# Patient Record
Sex: MALE | Race: WHITE | NOT HISPANIC OR LATINO | Employment: STUDENT | URBAN - METROPOLITAN AREA
[De-identification: names, ages, dates, MRNs, and addresses within clinical notes are randomized per-mention and may not be internally consistent; named-entity substitution may affect disease eponyms.]

---
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
## 2025-01-15 ENCOUNTER — APPOINTMENT (OUTPATIENT)
Dept: DERMATOLOGY | Facility: CLINIC | Age: 20
End: 2025-01-15
Payer: COMMERCIAL

## 2025-01-30 ENCOUNTER — APPOINTMENT (OUTPATIENT)
Dept: PRIMARY CARE | Facility: CLINIC | Age: 20
End: 2025-01-30
Payer: COMMERCIAL

## 2025-02-02 NOTE — PROGRESS NOTES
"[facility]: [facility]  PROGRESS NOTE  elida Faustin is a 19 y.o. male that is presenting today to establish care with new provider.  His last provider was his pediatrician Dr. Hill - last seen 10/25/2023    Assessment/Plan   {Assess/Plan SmartLinks (Optional):17509}  Subjective   HPI  Review of Systems   Objective   There were no vitals filed for this visit.   There is no height or weight on file to calculate BMI.  Physical Exam  Diagnostic Results   Lab Results   Component Value Date    GLUCOSE 92 03/21/2018    CALCIUM 9.5 03/21/2018     03/21/2018    K 4.3 03/21/2018    CO2 29 (H) 03/21/2018     03/21/2018    BUN 9 03/21/2018    CREATININE 0.63 03/21/2018     Lab Results   Component Value Date    ALT 19 03/21/2018    AST 18 03/21/2018    ALKPHOS 293 03/21/2018    BILITOT 0.6 03/21/2018     Lab Results   Component Value Date    WBC 6.9 03/21/2018    HGB 13.8 03/21/2018    HCT 40.3 03/21/2018    MCV 86 03/21/2018     03/21/2018     No results found for: \"CHOL\"  No results found for: \"HDL\"  No results found for: \"LDLCALC\"  No results found for: \"TRIG\"  No components found for: \"CHOLHDL\"  No results found for: \"HGBA1C\"  Other labs not included in the list above were reviewed either before or during this encounter.    History    Past Medical History:   Diagnosis Date    Encounter for screening for disorder due to exposure to contaminants     Screening for lead exposure    Other conditions influencing health status 09/09/2015    No significant past medical history    Other conditions influencing health status 09/09/2015    No significant past surgical history    Other injury of other muscle(s) and tendon(s) at lower leg level, unspecified leg, initial encounter 04/08/2022    Anterior shin splints    Post-traumatic stress disorder, unspecified     Post-traumatic stress     No past surgical history on file.  Family History   Problem Relation Name Age of Onset    " Asthma Father      Seizures Maternal Grandmother      Other (malignant neoplasm) Other          maternal relatives    Other (mental disorders) Other          paternal disorders     Social History     Socioeconomic History    Marital status: Single     Spouse name: Not on file    Number of children: Not on file    Years of education: Not on file    Highest education level: Not on file   Occupational History    Not on file   Tobacco Use    Smoking status: Not on file    Smokeless tobacco: Not on file   Substance and Sexual Activity    Alcohol use: Not on file    Drug use: Not on file    Sexual activity: Not on file   Other Topics Concern    Not on file   Social History Narrative    Not on file     Social Drivers of Health     Financial Resource Strain: Not on file   Food Insecurity: Not on file   Transportation Needs: Not on file   Physical Activity: Not on file   Stress: Not on file   Social Connections: Not on file   Intimate Partner Violence: Not on file   Housing Stability: Not on file     Allergies   Allergen Reactions    Nael Unknown     Current Outpatient Medications on File Prior to Visit   Medication Sig Dispense Refill    amphetamine-dextroamphetamine XR (Adderall XR) 15 mg 24 hr capsule       cloNIDine (Catapres) 0.1 mg tablet Take 1 tablet every day by oral route at bedtime for 30 days.      dexmethylphenidate XR (Focalin XR) 10 mg 24 hr capsule       EPINEPHrine 0.3 mg/0.3 mL injection syringe as directed for severe allergic reaction      FLUoxetine (PROzac) 20 mg tablet       triamcinolone (Kenalog) 0.1 % ointment        No current facility-administered medications on file prior to visit.     Immunization History   Administered Date(s) Administered    DTaP IPV combined vaccine (KINRIX, QUADRACEL) 05/22/2009    DTaP vaccine, pediatric  (INFANRIX) 2005, 07/19/2006    DTaP, Unspecified 2005, 2005    Flu vaccine (IIV4), preservative free *Check age/dose* 10/25/2023    HPV 9-valent vaccine  (GARDASIL 9) 09/14/2016, 08/25/2017    Hepatitis A vaccine, pediatric/adolescent (HAVRIX, VAQTA) 10/02/2013, 07/16/2014    Hepatitis B vaccine, 19 yrs and under (RECOMBIVAX, ENGERIX) 2005, 2005, 2005    HiB PRP-T conjugate vaccine (HIBERIX, ACTHIB) 07/19/2006    HiB, unspecified 2005, 2005, 2005    Influenza, injectable, quadrivalent 11/03/2020    MMR vaccine, subcutaneous (MMR II) 03/24/2006, 05/22/2009    Meningococcal ACWY vaccine (MENVEO) 09/14/2016, 08/17/2022    Pfizer Purple Cap SARS-CoV-2 03/28/2021, 04/18/2021, 07/20/2022    Pneumococcal Conjugate PCV 7 2005, 2005, 2005, 07/19/2006    Poliovirus vaccine, subcutaneous (IPOL) 2005, 2005, 04/18/2007, 09/18/2007    Tdap vaccine, age 7 year and older (BOOSTRIX, ADACEL) 09/14/2016    Varicella vaccine, subcutaneous (VARIVAX) 03/24/2006, 05/22/2009     Patient's medical history was reviewed and updated either before or during this encounter.       Lien Horn, APRN-CNP

## 2025-02-03 ENCOUNTER — APPOINTMENT (OUTPATIENT)
Dept: PRIMARY CARE | Facility: CLINIC | Age: 20
End: 2025-02-03
Payer: COMMERCIAL

## 2025-02-03 DIAGNOSIS — Z76.89 ENCOUNTER TO ESTABLISH CARE: Primary | ICD-10-CM

## 2025-02-04 PROBLEM — M21.40 ACQUIRED PES PLANUS: Status: ACTIVE | Noted: 2025-02-04

## 2025-02-04 PROBLEM — J10.1 INFLUENZA DUE TO INFLUENZA A VIRUS: Status: ACTIVE | Noted: 2025-02-04

## 2025-02-10 RX ORDER — SERTRALINE HYDROCHLORIDE 100 MG/1
TABLET, FILM COATED ORAL
COMMUNITY
End: 2025-02-11 | Stop reason: ALTCHOICE

## 2025-02-10 NOTE — PROGRESS NOTES
[facility]: [facility]  PROGRESS NOTE      Martinez Faustin is a 19 y.o. male that is presenting today to establish care with new provider.  His last provider was his pediatrician Dr. Hill - last seen 10/25/2023.  Has not seen a provider since 2023.  He has several concerns today.  He reports when he works out he gets hives and a sensation of pins and needles in his hands.  No chest pain, pressure or sob.  No dizziness or lightheadedness.  States he only eats once a day.  Has a history of left patellar tendonitis and when he does leg day at the gym he has left knee pain.  Denies an recent injury and no swelling.  Pain is achy and intermittent.  He also c/o of left muscular pain under his scapula when he does a lot of heavy lifting at the gym.      Assessment/Plan   Assessment & Plan  Encounter to establish care  Medications and problem list reviewed    Orders:    Comprehensive Metabolic Panel; Future    CBC; Future    Excessive sweating    Orders:    Thyroid Stimulating Hormone; Future    History of hepatomegaly  If abnormal will get us of liver  Orders:    Lipid Panel; Future    Muscle spasm  Massage area  Warm compresses or biofreeze, voltaren gel  Take first dose of flexeril at home to see how you feel can make you sleepy  Orders:    cyclobenzaprine (Flexeril) 10 mg tablet; Take 1 tablet (10 mg) by mouth 3 times a day as needed for muscle spasms.    Left knee pain, unspecified chronicity  Hx: patellar tendonitis  Will get x ray and will notify patient of results  Orders:    XR knee left 1-2 views; Future    Cholinergic urticaria  Stay hydrated prior to work outs  Shower after work outs  Eat at least 3 times a day tala protein         Subjective   HPI  Review of Systems   Constitutional: Negative.    Respiratory: Negative.     Cardiovascular: Negative.    Musculoskeletal:         See hpi for details   Skin:         See hpi for details   Neurological:         See hpi for details  "  Psychiatric/Behavioral: Negative.        Objective   Vitals:    02/11/25 1505   BP: 122/69   Pulse: 66   Temp: 36.6 °C (97.8 °F)   SpO2: 98%      Body mass index is 31.04 kg/m².  Physical Exam  Vitals reviewed.   Constitutional:       Appearance: Normal appearance.   Cardiovascular:      Rate and Rhythm: Normal rate and regular rhythm.      Heart sounds: Normal heart sounds.   Pulmonary:      Effort: Pulmonary effort is normal.      Breath sounds: Normal breath sounds.   Abdominal:      General: Bowel sounds are normal.      Palpations: Abdomen is soft.   Musculoskeletal:      Comments: Tight right scapular muscle - spasm  Normal left knee exam - no swelling, creptitus or pain with palpation  No tenderness with palpation.     Skin:     General: Skin is warm and dry.   Neurological:      General: No focal deficit present.      Mental Status: He is alert and oriented to person, place, and time.   Psychiatric:         Mood and Affect: Mood normal.         Behavior: Behavior normal.         Thought Content: Thought content normal.         Judgment: Judgment normal.       Diagnostic Results   Lab Results   Component Value Date    GLUCOSE 92 03/21/2018    CALCIUM 9.5 03/21/2018     03/21/2018    K 4.3 03/21/2018    CO2 29 (H) 03/21/2018     03/21/2018    BUN 9 03/21/2018    CREATININE 0.63 03/21/2018     Lab Results   Component Value Date    ALT 19 03/21/2018    AST 18 03/21/2018    ALKPHOS 293 03/21/2018    BILITOT 0.6 03/21/2018     Lab Results   Component Value Date    WBC 6.9 03/21/2018    HGB 13.8 03/21/2018    HCT 40.3 03/21/2018    MCV 86 03/21/2018     03/21/2018     No results found for: \"CHOL\"  No results found for: \"HDL\"  No results found for: \"LDLCALC\"  No results found for: \"TRIG\"  No components found for: \"CHOLHDL\"  No results found for: \"HGBA1C\"  Other labs not included in the list above were reviewed either before or during this encounter.    History    Past Medical History: "   Diagnosis Date    Anxiety     Depression     Encounter for screening for disorder due to exposure to contaminants     Screening for lead exposure    Other conditions influencing health status 09/09/2015    No significant past medical history    Other conditions influencing health status 09/09/2015    No significant past surgical history    Other injury of other muscle(s) and tendon(s) at lower leg level, unspecified leg, initial encounter 04/08/2022    Anterior shin splints    Post-traumatic stress disorder, unspecified     Post-traumatic stress     History reviewed. No pertinent surgical history.  Family History   Problem Relation Name Age of Onset    No Known Problems Mother      Emphysema Father      Seizures Maternal Grandmother      Other (malignant neoplasm) Other          maternal relatives    Other (mental disorders) Other          paternal disorders     Social History     Socioeconomic History    Marital status: Single     Spouse name: Not on file    Number of children: Not on file    Years of education: Not on file    Highest education level: Not on file   Occupational History    Not on file   Tobacco Use    Smoking status: Never     Passive exposure: Past    Smokeless tobacco: Never   Vaping Use    Vaping status: Some Days    Substances: THC    Devices: Disposable    Passive vaping exposure: Yes   Substance and Sexual Activity    Alcohol use: Never    Drug use: Yes     Types: Marijuana    Sexual activity: Not on file   Other Topics Concern    Not on file   Social History Narrative    Not on file     Social Drivers of Health     Financial Resource Strain: Not on file   Food Insecurity: Not on file   Transportation Needs: Not on file   Physical Activity: Not on file   Stress: Not on file   Social Connections: Not on file   Intimate Partner Violence: Not on file   Housing Stability: Not on file     Allergies   Allergen Reactions    Nael Unknown     Current Outpatient Medications on File Prior to Visit    Medication Sig Dispense Refill    acetaminophen (Tylenol) 325 mg tablet Take by mouth every 6 hours if needed for mild pain (1 - 3).      CREATINE, BULK, MISC       EPINEPHrine 0.3 mg/0.3 mL injection syringe as directed for severe allergic reaction      methyl salicylate-menthol (Icy Hot) 29-7.6 % ointment ointment Apply 1 Application topically if needed.      [DISCONTINUED] amphetamine-dextroamphetamine XR (Adderall XR) 10 mg 24 hr capsule       [DISCONTINUED] cloNIDine (Catapres) 0.1 mg tablet Take 1 tablet every day by oral route at bedtime for 30 days.      [DISCONTINUED] dexmethylphenidate XR (Focalin XR) 10 mg 24 hr capsule       [DISCONTINUED] FLUoxetine (PROzac) 20 mg tablet       [DISCONTINUED] ibuprofen 200 mg tablet Take by mouth every 6 hours.      [DISCONTINUED] sertraline (Zoloft) 100 mg tablet       [DISCONTINUED] triamcinolone (Kenalog) 0.1 % ointment        No current facility-administered medications on file prior to visit.     Immunization History   Administered Date(s) Administered    DTaP IPV combined vaccine (KINRIX, QUADRACEL) 05/22/2009    DTaP vaccine, pediatric  (INFANRIX) 2005, 07/19/2006    DTaP, Unspecified 2005, 2005    Flu vaccine (IIV4), preservative free *Check age/dose* 10/25/2023    HPV 9-valent vaccine (GARDASIL 9) 09/14/2016, 08/25/2017    Hepatitis A vaccine, pediatric/adolescent (HAVRIX, VAQTA) 10/02/2013, 07/16/2014    Hepatitis B vaccine, 19 yrs and under (RECOMBIVAX, ENGERIX) 2005, 2005, 2005    HiB PRP-T conjugate vaccine (HIBERIX, ACTHIB) 07/19/2006    HiB, unspecified 2005, 2005, 2005    Influenza, injectable, quadrivalent 11/03/2020, 11/30/2022    Influenza, seasonal, injectable 2005    MMR vaccine, subcutaneous (MMR II) 03/24/2006, 05/22/2009    Meningococcal ACWY vaccine (MENVEO) 09/14/2016, 08/17/2022    Meningococcal ACWY-D (Menactra) 4-valent conjugate vaccine 2005    Pfizer Purple Cap SARS-CoV-2  03/28/2021, 04/18/2021, 07/20/2022    Pneumococcal Conjugate PCV 7 2005, 2005, 2005, 07/19/2006    Poliovirus vaccine, subcutaneous (IPOL) 2005, 2005, 04/18/2007, 09/18/2007    Tdap vaccine, age 7 year and older (BOOSTRIX, ADACEL) 09/14/2016    Varicella vaccine, subcutaneous (VARIVAX) 03/24/2006, 05/22/2009     Patient's medical history was reviewed and updated either before or during this encounter.       Lien Horn, APRN-CNP

## 2025-02-11 ENCOUNTER — HOSPITAL ENCOUNTER (OUTPATIENT)
Dept: RADIOLOGY | Facility: CLINIC | Age: 20
Discharge: HOME | End: 2025-02-11
Payer: COMMERCIAL

## 2025-02-11 ENCOUNTER — APPOINTMENT (OUTPATIENT)
Dept: PRIMARY CARE | Facility: CLINIC | Age: 20
End: 2025-02-11
Payer: COMMERCIAL

## 2025-02-11 VITALS
SYSTOLIC BLOOD PRESSURE: 122 MMHG | DIASTOLIC BLOOD PRESSURE: 69 MMHG | TEMPERATURE: 97.8 F | WEIGHT: 245 LBS | BODY MASS INDEX: 30.46 KG/M2 | OXYGEN SATURATION: 98 % | HEART RATE: 66 BPM | HEIGHT: 75 IN

## 2025-02-11 DIAGNOSIS — Z76.89 ENCOUNTER TO ESTABLISH CARE: Primary | ICD-10-CM

## 2025-02-11 DIAGNOSIS — R61 EXCESSIVE SWEATING: ICD-10-CM

## 2025-02-11 DIAGNOSIS — Z87.898 HISTORY OF HEPATOMEGALY: ICD-10-CM

## 2025-02-11 DIAGNOSIS — L50.5 CHOLINERGIC URTICARIA: ICD-10-CM

## 2025-02-11 DIAGNOSIS — M25.562 LEFT KNEE PAIN, UNSPECIFIED CHRONICITY: ICD-10-CM

## 2025-02-11 DIAGNOSIS — M62.838 MUSCLE SPASM: ICD-10-CM

## 2025-02-11 PROBLEM — F32.A DEPRESSIVE DISORDER: Status: RESOLVED | Noted: 2023-04-12 | Resolved: 2025-02-11

## 2025-02-11 PROBLEM — F43.22 ADJUSTMENT DISORDER WITH ANXIOUS MOOD: Status: RESOLVED | Noted: 2023-04-12 | Resolved: 2025-02-11

## 2025-02-11 PROBLEM — R05.9 COUGH: Status: RESOLVED | Noted: 2023-04-12 | Resolved: 2025-02-11

## 2025-02-11 PROBLEM — F41.9 ANXIETY: Status: RESOLVED | Noted: 2023-04-12 | Resolved: 2025-02-11

## 2025-02-11 PROBLEM — J10.1 INFLUENZA DUE TO INFLUENZA A VIRUS: Status: RESOLVED | Noted: 2025-02-04 | Resolved: 2025-02-11

## 2025-02-11 PROCEDURE — 3008F BODY MASS INDEX DOCD: CPT | Performed by: NURSE PRACTITIONER

## 2025-02-11 PROCEDURE — 73560 X-RAY EXAM OF KNEE 1 OR 2: CPT | Mod: LT

## 2025-02-11 PROCEDURE — 99204 OFFICE O/P NEW MOD 45 MIN: CPT | Performed by: NURSE PRACTITIONER

## 2025-02-11 PROCEDURE — 99214 OFFICE O/P EST MOD 30 MIN: CPT | Performed by: NURSE PRACTITIONER

## 2025-02-11 PROCEDURE — 1036F TOBACCO NON-USER: CPT | Performed by: NURSE PRACTITIONER

## 2025-02-11 RX ORDER — ACETAMINOPHEN 325 MG/1
TABLET ORAL EVERY 6 HOURS PRN
COMMUNITY

## 2025-02-11 RX ORDER — MENTHOL AND METHYL SALICYLATE 7.6; 29 G/100G; G/100G
1 OINTMENT TOPICAL AS NEEDED
COMMUNITY

## 2025-02-11 RX ORDER — CYCLOBENZAPRINE HCL 10 MG
10 TABLET ORAL 3 TIMES DAILY PRN
Qty: 30 TABLET | Refills: 0 | Status: SHIPPED | OUTPATIENT
Start: 2025-02-11 | End: 2025-04-12

## 2025-02-11 ASSESSMENT — PATIENT HEALTH QUESTIONNAIRE - PHQ9
1. LITTLE INTEREST OR PLEASURE IN DOING THINGS: NOT AT ALL
2. FEELING DOWN, DEPRESSED OR HOPELESS: NOT AT ALL
SUM OF ALL RESPONSES TO PHQ9 QUESTIONS 1 AND 2: 0

## 2025-02-11 ASSESSMENT — ENCOUNTER SYMPTOMS
ROS SKIN COMMENTS: SEE HPI FOR DETAILS
CONSTITUTIONAL NEGATIVE: 1
CARDIOVASCULAR NEGATIVE: 1
LOSS OF SENSATION IN FEET: 0
RESPIRATORY NEGATIVE: 1
DEPRESSION: 0
PSYCHIATRIC NEGATIVE: 1
OCCASIONAL FEELINGS OF UNSTEADINESS: 0

## 2025-02-11 ASSESSMENT — PAIN SCALES - GENERAL: PAINLEVEL_OUTOF10: 2

## 2025-02-12 LAB
ALBUMIN SERPL-MCNC: 4.7 G/DL (ref 3.6–5.1)
ALP SERPL-CCNC: 70 U/L (ref 46–169)
ALT SERPL-CCNC: 85 U/L (ref 8–46)
ANION GAP SERPL CALCULATED.4IONS-SCNC: 7 MMOL/L (CALC) (ref 7–17)
AST SERPL-CCNC: 74 U/L (ref 12–32)
BILIRUB SERPL-MCNC: 1.8 MG/DL (ref 0.2–1.1)
BUN SERPL-MCNC: 13 MG/DL (ref 7–20)
CALCIUM SERPL-MCNC: 9.6 MG/DL (ref 8.9–10.4)
CHLORIDE SERPL-SCNC: 104 MMOL/L (ref 98–110)
CHOLEST SERPL-MCNC: 154 MG/DL
CHOLEST/HDLC SERPL: 3.5 (CALC)
CO2 SERPL-SCNC: 28 MMOL/L (ref 20–32)
CREAT SERPL-MCNC: 1.06 MG/DL (ref 0.6–1.24)
EGFRCR SERPLBLD CKD-EPI 2021: 104 ML/MIN/1.73M2
ERYTHROCYTE [DISTWIDTH] IN BLOOD BY AUTOMATED COUNT: 12.1 % (ref 11–15)
GLUCOSE SERPL-MCNC: 88 MG/DL (ref 65–99)
HCT VFR BLD AUTO: 46.4 % (ref 38.5–50)
HDLC SERPL-MCNC: 44 MG/DL
HGB BLD-MCNC: 15.8 G/DL (ref 13.2–17.1)
LDLC SERPL CALC-MCNC: 94 MG/DL (CALC)
MCH RBC QN AUTO: 31.7 PG (ref 27–33)
MCHC RBC AUTO-ENTMCNC: 34.1 G/DL (ref 32–36)
MCV RBC AUTO: 93 FL (ref 80–100)
NONHDLC SERPL-MCNC: 110 MG/DL (CALC)
PLATELET # BLD AUTO: 247 THOUSAND/UL (ref 140–400)
PMV BLD REES-ECKER: 11.7 FL (ref 7.5–12.5)
POTASSIUM SERPL-SCNC: 4.4 MMOL/L (ref 3.8–5.1)
PROT SERPL-MCNC: 6.7 G/DL (ref 6.3–8.2)
RBC # BLD AUTO: 4.99 MILLION/UL (ref 4.2–5.8)
SODIUM SERPL-SCNC: 139 MMOL/L (ref 135–146)
TRIGL SERPL-MCNC: 71 MG/DL
TSH SERPL-ACNC: 0.92 MIU/L (ref 0.5–4.3)
WBC # BLD AUTO: 5.5 THOUSAND/UL (ref 3.8–10.8)

## 2025-02-12 NOTE — RESULT ENCOUNTER NOTE
Let the pt know, No fracture or dislocation of his knee - shows a history of osgood-schlatter's disease - an inflammation of the patellar ligament and tibial tuberosity per Lien Horn, CNP

## 2025-02-13 ENCOUNTER — APPOINTMENT (OUTPATIENT)
Dept: PRIMARY CARE | Facility: CLINIC | Age: 20
End: 2025-02-13
Payer: COMMERCIAL

## 2025-02-14 ENCOUNTER — APPOINTMENT (OUTPATIENT)
Dept: PRIMARY CARE | Facility: CLINIC | Age: 20
End: 2025-02-14
Payer: COMMERCIAL

## 2025-03-11 ENCOUNTER — APPOINTMENT (OUTPATIENT)
Dept: DERMATOLOGY | Facility: CLINIC | Age: 20
End: 2025-03-11
Payer: COMMERCIAL

## 2025-03-14 ENCOUNTER — APPOINTMENT (OUTPATIENT)
Dept: GASTROENTEROLOGY | Facility: CLINIC | Age: 20
End: 2025-03-14
Payer: COMMERCIAL

## 2025-05-20 ENCOUNTER — TELEPHONE (OUTPATIENT)
Dept: PRIMARY CARE | Facility: CLINIC | Age: 20
End: 2025-05-20
Payer: COMMERCIAL